# Patient Record
Sex: FEMALE | Race: BLACK OR AFRICAN AMERICAN | Employment: OTHER | URBAN - METROPOLITAN AREA
[De-identification: names, ages, dates, MRNs, and addresses within clinical notes are randomized per-mention and may not be internally consistent; named-entity substitution may affect disease eponyms.]

---

## 2017-09-25 ENCOUNTER — HOSPITAL ENCOUNTER (OUTPATIENT)
Dept: PREADMISSION TESTING | Age: 82
Discharge: HOME | End: 2017-09-25
Attending: OBSTETRICS & GYNECOLOGY | Admitting: OBSTETRICS & GYNECOLOGY

## 2017-09-25 VITALS — WEIGHT: 157 LBS | BODY MASS INDEX: 26.8 KG/M2 | HEIGHT: 64 IN

## 2017-09-25 DIAGNOSIS — Z01.811 PRE-OP CHEST EXAM: ICD-10-CM

## 2017-09-25 LAB
A/G RATIO: 1.3 (ref 1.1–2.2)
ABO/RH: NORMAL
ALBUMIN SERPL-MCNC: 4.3 G/DL (ref 3.4–5)
ALP BLD-CCNC: 73 U/L (ref 40–129)
ALT SERPL-CCNC: 34 U/L (ref 10–40)
ANION GAP SERPL CALCULATED.3IONS-SCNC: 9 MMOL/L (ref 3–16)
ANTIBODY SCREEN: NORMAL
AST SERPL-CCNC: 49 U/L (ref 15–37)
BASOPHILS ABSOLUTE: 0 K/UL (ref 0–0.2)
BASOPHILS RELATIVE PERCENT: 1.1 %
BILIRUB SERPL-MCNC: 0.5 MG/DL (ref 0–1)
BUN BLDV-MCNC: 16 MG/DL (ref 7–20)
CALCIUM SERPL-MCNC: 9 MG/DL (ref 8.3–10.6)
CHLORIDE BLD-SCNC: 101 MMOL/L (ref 99–110)
CO2: 31 MMOL/L (ref 21–32)
CREAT SERPL-MCNC: 0.7 MG/DL (ref 0.6–1.2)
EOSINOPHILS ABSOLUTE: 0 K/UL (ref 0–0.6)
EOSINOPHILS RELATIVE PERCENT: 1.1 %
GFR AFRICAN AMERICAN: >60
GFR NON-AFRICAN AMERICAN: >60
GLOBULIN: 3.2 G/DL
GLUCOSE BLD-MCNC: 115 MG/DL (ref 70–99)
HCT VFR BLD CALC: 39.5 % (ref 36–48)
HEMOGLOBIN: 12.6 G/DL (ref 12–16)
LYMPHOCYTES ABSOLUTE: 1.7 K/UL (ref 1–5.1)
LYMPHOCYTES RELATIVE PERCENT: 43 %
MCH RBC QN AUTO: 26.9 PG (ref 26–34)
MCHC RBC AUTO-ENTMCNC: 31.9 G/DL (ref 31–36)
MCV RBC AUTO: 84.4 FL (ref 80–100)
MONOCYTES ABSOLUTE: 0.4 K/UL (ref 0–1.3)
MONOCYTES RELATIVE PERCENT: 10.6 %
NEUTROPHILS ABSOLUTE: 1.7 K/UL (ref 1.7–7.7)
NEUTROPHILS RELATIVE PERCENT: 44.2 %
PDW BLD-RTO: 15.3 % (ref 12.4–15.4)
PLATELET # BLD: 166 K/UL (ref 135–450)
PMV BLD AUTO: 8.5 FL (ref 5–10.5)
POTASSIUM SERPL-SCNC: 4 MMOL/L (ref 3.5–5.1)
RBC # BLD: 4.68 M/UL (ref 4–5.2)
SODIUM BLD-SCNC: 141 MMOL/L (ref 136–145)
TOTAL PROTEIN: 7.5 G/DL (ref 6.4–8.2)
WBC # BLD: 3.9 K/UL (ref 4–11)

## 2017-09-25 RX ORDER — CYCLOBENZAPRINE HCL 10 MG
10 TABLET ORAL 3 TIMES DAILY PRN
COMMUNITY
End: 2019-01-15 | Stop reason: ALTCHOICE

## 2017-09-25 RX ORDER — CIPROFLOXACIN 2 MG/ML
400 INJECTION, SOLUTION INTRAVENOUS
Status: CANCELLED | OUTPATIENT
Start: 2017-09-28 | End: 2017-09-28

## 2017-09-25 RX ORDER — NAPROXEN 500 MG/1
500 TABLET ORAL 2 TIMES DAILY WITH MEALS
COMMUNITY
End: 2019-01-15 | Stop reason: ALTCHOICE

## 2017-09-25 RX ORDER — VITAMIN E 268 MG
400 CAPSULE ORAL DAILY
COMMUNITY

## 2017-09-25 RX ORDER — FUROSEMIDE 40 MG/1
40 TABLET ORAL DAILY
COMMUNITY

## 2017-09-25 RX ORDER — TRAMADOL HYDROCHLORIDE 50 MG/1
50 TABLET ORAL EVERY 6 HOURS PRN
COMMUNITY
End: 2019-01-15 | Stop reason: ALTCHOICE

## 2017-09-25 RX ORDER — LIDOCAINE HYDROCHLORIDE 10 MG/ML
0.5 INJECTION, SOLUTION EPIDURAL; INFILTRATION; INTRACAUDAL; PERINEURAL ONCE
Status: CANCELLED | OUTPATIENT
Start: 2017-09-28

## 2017-09-25 RX ORDER — PHENAZOPYRIDINE HYDROCHLORIDE 100 MG/1
200 TABLET, FILM COATED ORAL
Status: CANCELLED | OUTPATIENT
Start: 2017-09-28 | End: 2017-09-28

## 2017-09-25 RX ORDER — ACETAMINOPHEN 500 MG
500 TABLET ORAL EVERY 6 HOURS PRN
COMMUNITY

## 2017-09-25 RX ORDER — DILTIAZEM HYDROCHLORIDE 60 MG/1
60 TABLET, FILM COATED ORAL 4 TIMES DAILY PRN
COMMUNITY
End: 2019-01-15 | Stop reason: ALTCHOICE

## 2017-09-25 RX ORDER — ASCORBIC ACID 500 MG
500 TABLET ORAL DAILY
COMMUNITY

## 2017-09-25 RX ORDER — SODIUM CHLORIDE, SODIUM LACTATE, POTASSIUM CHLORIDE, CALCIUM CHLORIDE 600; 310; 30; 20 MG/100ML; MG/100ML; MG/100ML; MG/100ML
INJECTION, SOLUTION INTRAVENOUS CONTINUOUS
Status: CANCELLED | OUTPATIENT
Start: 2017-09-28

## 2017-09-25 RX ORDER — OMEPRAZOLE 40 MG/1
40 CAPSULE, DELAYED RELEASE ORAL 2 TIMES DAILY
COMMUNITY
End: 2020-11-10 | Stop reason: ALTCHOICE

## 2017-09-25 RX ORDER — HEPARIN SODIUM 5000 [USP'U]/ML
5000 INJECTION, SOLUTION INTRAVENOUS; SUBCUTANEOUS
Status: CANCELLED | OUTPATIENT
Start: 2017-09-28 | End: 2017-09-28

## 2017-09-25 RX ORDER — CHLORAL HYDRATE 500 MG
3000 CAPSULE ORAL 3 TIMES DAILY
COMMUNITY
End: 2019-01-15 | Stop reason: ALTCHOICE

## 2017-09-25 RX ORDER — ATORVASTATIN CALCIUM 40 MG/1
40 TABLET, FILM COATED ORAL DAILY
COMMUNITY

## 2017-09-25 RX ORDER — LOSARTAN POTASSIUM 50 MG/1
50 TABLET ORAL DAILY
COMMUNITY

## 2017-09-25 RX ORDER — FAMOTIDINE 20 MG/1
20 TABLET, FILM COATED ORAL 2 TIMES DAILY
Status: ON HOLD | COMMUNITY
End: 2017-09-28 | Stop reason: ALTCHOICE

## 2017-09-29 PROBLEM — I48.91 ATRIAL FIBRILLATION (HCC): Status: ACTIVE | Noted: 2017-02-28

## 2017-09-29 PROBLEM — I21.09 ACUTE MYOCARDIAL INFARCTION OF ANTERIOR WALL (HCC): Status: ACTIVE | Noted: 2017-02-28

## 2017-09-29 PROBLEM — I48.0 PAROXYSMAL ATRIAL FIBRILLATION (HCC): Status: ACTIVE | Noted: 2017-07-21

## 2017-09-29 PROBLEM — I10 ESSENTIAL (PRIMARY) HYPERTENSION: Status: ACTIVE | Noted: 2017-01-19

## 2019-01-15 RX ORDER — FERROUS SULFATE 325(65) MG
325 TABLET ORAL
COMMUNITY

## 2019-01-15 RX ORDER — CHOLECALCIFEROL (VITAMIN D3) 125 MCG
500 CAPSULE ORAL DAILY
COMMUNITY

## 2019-01-15 RX ORDER — OXYBUTYNIN CHLORIDE 5 MG/1
5 TABLET ORAL DAILY
COMMUNITY
End: 2020-11-10 | Stop reason: ALTCHOICE

## 2019-01-15 RX ORDER — PREDNISOLONE SODIUM PHOSPHATE 10 MG/ML
SOLUTION/ DROPS OPHTHALMIC
COMMUNITY
End: 2020-11-10 | Stop reason: ALTCHOICE

## 2019-01-15 RX ORDER — MULTIVIT-MIN/IRON/FOLIC ACID/K 18-600-40
CAPSULE ORAL DAILY
COMMUNITY

## 2019-01-15 RX ORDER — AMLODIPINE BESYLATE 5 MG/1
5 TABLET ORAL DAILY
COMMUNITY

## 2019-01-15 NOTE — PROGRESS NOTES
Name_______________________________________Printed:____________________  Date and time of ngqlwgp__6-20[birth year]______________________Zsstvqp Time:_1130 MASC_______________   1. Do not eat or drink anything after 12 midnight (or____hours) prior to surgery. This includes no water, chewing gum or mints. Endoscopy patients follow your doctors bowel prep instructions,which may include taking part of prep after midnight. 2. Take the following pills with a small sip of water on the morning of surgery___SOTOLOL, OMEPRAZOLE, AMLODIPINE __________________________________________________   3. Aspirin, Ibuprofen, Advil, Naproxen, Vitamin E and other Anti-inflammatory products should be stopped for 5 days before surgery or as directed by your physician. 4. Check with your Doctor regarding stopping Plavix, Coumadin,Eliquis, Lovenox,Effient,Pradaxa,Xarelto, Fragmin or other blood thinners and follow their instructions. 5. Do not smoke, and do not drink any alcoholic beverages 24 hours prior to surgery. This includes NA Beer. Refrain from the usage of any recreational drugs. 6. You may brush your teeth and gargle the morning of surgery. DO NOT SWALLOW WATER   7. You MUST make arrangements for a responsible adult to stay on site while you are here and take you home after your surgery. You will not be allowed to leave alone or drive yourself home. It is strongly suggested someone stay with you the first 24 hrs. Your surgery will be cancelled if you do not have a ride home. 8. A parent/legal guardian must accompany a child scheduled for surgery and plan to stay at the hospital until the child is discharged. Please do not bring other children with you. 9. Please wear simple, loose fitting clothing to the hospital.  Alber Nones not bring valuables (money, credit cards, checkbooks, etc.) Do not wear any makeup (including no eye makeup) or nail polish on your fingers or toes.              10. DO NOT wear any jewelry or piercings on day of surgery. All body piercing jewelry must be removed. 11. If you have ___dentures, they will be removed before going to the OR; we will provide you a container. If you wear ___contact lenses or ___glasses, they will be removed; please bring a case for them. 12. Please see your family doctor/pediatrician for a history & physical and/or concerning medications. Bring any test results/reports from your physician's office. PCP__________________Phone___________H&P Appt. Date________             13 If you  have a Living Will and Durable Power of  for Healthcare, please bring in a copy. 15. Notify your Surgeon if you develop any illness between now and surgery  time, cough, cold, fever, sore throat, nausea, vomiting, etc.  Please notify your surgeon if you experience dizziness, shortness of breath or blurred vision between now & the time of your surgery             15. DO NOT shave your operative site 96 hours prior to surgery. For face & neck surgery, men may use an electric razor 48 hours prior to surgery. 16. Shower the night before surgery with ___Antibacterial soap ___Hibiclens             17. To provide excellent care visitors will be limited to one in the room at any given time. 18.  Please bring picture ID and insurance card. 19.  Visit our web site for additional information:  [email or web address]              20.During flu season no children under the age of 15 are permitted in the hospital for the safety of all patients. 21. If you take a long acting insulin in the evening only  take half of your usual  dose the night  before your procedure              22. If you use a c-pap please bring DOS if staying overnight,             23.For your convenience [facility] has a pharmacy on site to fill your prescriptions.              24. If you use oxygen and have a portable tank please bring it  with you the DOS 22. Bring a complete list of all your medications with name and dose include any supplements. 26. Other__________________________________________   *Please call pre admission testing if you any further questions   [address]    [address]  [phone]   [address] [place] [address]       All above information reviewed with patient in person or by phone. Patient verbalizes understanding. All questions and concerns addressed.                                                                                                  Patient/Rep____________________                                                                                                                                    PRE OP INSTRUCTIONS

## 2019-01-25 ENCOUNTER — HOSPITAL ENCOUNTER (OUTPATIENT)
Age: 84
Setting detail: OUTPATIENT SURGERY
Discharge: HOME | End: 2019-01-25
Attending: PODIATRIST | Admitting: PODIATRIST
Payer: MEDICARE

## 2019-01-25 ENCOUNTER — ANESTHESIA (OUTPATIENT)
Dept: OPERATING ROOM | Age: 84
End: 2019-01-25
Payer: MEDICARE

## 2019-01-25 ENCOUNTER — ANESTHESIA EVENT (OUTPATIENT)
Dept: OPERATING ROOM | Age: 84
End: 2019-01-25
Payer: MEDICARE

## 2019-01-25 VITALS
HEART RATE: 51 BPM | WEIGHT: 158.9 LBS | RESPIRATION RATE: 17 BRPM | HEIGHT: 64 IN | OXYGEN SATURATION: 100 % | TEMPERATURE: 98.7 F | SYSTOLIC BLOOD PRESSURE: 140 MMHG | BODY MASS INDEX: 27.13 KG/M2 | DIASTOLIC BLOOD PRESSURE: 71 MMHG

## 2019-01-25 VITALS — SYSTOLIC BLOOD PRESSURE: 89 MMHG | TEMPERATURE: 96.8 F | DIASTOLIC BLOOD PRESSURE: 50 MMHG | OXYGEN SATURATION: 98 %

## 2019-01-25 DIAGNOSIS — M79.671 PAIN IN BOTH FEET: Primary | ICD-10-CM

## 2019-01-25 DIAGNOSIS — M79.672 PAIN IN BOTH FEET: Primary | ICD-10-CM

## 2019-01-25 PROCEDURE — 3600000014 HC SURGERY LEVEL 4 ADDTL 15MIN: Performed by: PODIATRIST

## 2019-01-25 PROCEDURE — 2500000003 HC RX 250 WO HCPCS: Performed by: PODIATRIST

## 2019-01-25 PROCEDURE — 7100000011 HC PHASE II RECOVERY - ADDTL 15 MIN: Performed by: PODIATRIST

## 2019-01-25 PROCEDURE — 2580000003 HC RX 258: Performed by: FAMILY MEDICINE

## 2019-01-25 PROCEDURE — 3700000000 HC ANESTHESIA ATTENDED CARE: Performed by: PODIATRIST

## 2019-01-25 PROCEDURE — 7100000000 HC PACU RECOVERY - FIRST 15 MIN: Performed by: PODIATRIST

## 2019-01-25 PROCEDURE — 3600000004 HC SURGERY LEVEL 4 BASE: Performed by: PODIATRIST

## 2019-01-25 PROCEDURE — 3700000001 HC ADD 15 MINUTES (ANESTHESIA): Performed by: PODIATRIST

## 2019-01-25 PROCEDURE — 7100000001 HC PACU RECOVERY - ADDTL 15 MIN: Performed by: PODIATRIST

## 2019-01-25 PROCEDURE — 2500000003 HC RX 250 WO HCPCS: Performed by: NURSE ANESTHETIST, CERTIFIED REGISTERED

## 2019-01-25 PROCEDURE — 7100000010 HC PHASE II RECOVERY - FIRST 15 MIN: Performed by: PODIATRIST

## 2019-01-25 PROCEDURE — 2709999900 HC NON-CHARGEABLE SUPPLY: Performed by: PODIATRIST

## 2019-01-25 PROCEDURE — 6360000002 HC RX W HCPCS: Performed by: NURSE ANESTHETIST, CERTIFIED REGISTERED

## 2019-01-25 RX ORDER — SODIUM CHLORIDE 9 MG/ML
INJECTION, SOLUTION INTRAVENOUS CONTINUOUS
Status: DISCONTINUED | OUTPATIENT
Start: 2019-01-25 | End: 2019-01-25 | Stop reason: HOSPADM

## 2019-01-25 RX ORDER — BUPIVACAINE HYDROCHLORIDE 5 MG/ML
INJECTION, SOLUTION EPIDURAL; INTRACAUDAL
Status: COMPLETED | OUTPATIENT
Start: 2019-01-25 | End: 2019-01-25

## 2019-01-25 RX ORDER — CLINDAMYCIN PHOSPHATE 900 MG/50ML
900 INJECTION INTRAVENOUS
Status: COMPLETED | OUTPATIENT
Start: 2019-01-25 | End: 2019-01-25

## 2019-01-25 RX ORDER — DIPHENHYDRAMINE HYDROCHLORIDE 50 MG/ML
12.5 INJECTION INTRAMUSCULAR; INTRAVENOUS
Status: DISCONTINUED | OUTPATIENT
Start: 2019-01-25 | End: 2019-01-25 | Stop reason: HOSPADM

## 2019-01-25 RX ORDER — OXYCODONE HYDROCHLORIDE AND ACETAMINOPHEN 5; 325 MG/1; MG/1
1 TABLET ORAL EVERY 6 HOURS PRN
Qty: 28 TABLET | Refills: 0 | Status: SHIPPED | OUTPATIENT
Start: 2019-01-25 | End: 2019-02-01

## 2019-01-25 RX ORDER — LIDOCAINE HYDROCHLORIDE 10 MG/ML
0.5 INJECTION, SOLUTION EPIDURAL; INFILTRATION; INTRACAUDAL; PERINEURAL ONCE
Status: DISCONTINUED | OUTPATIENT
Start: 2019-01-25 | End: 2019-01-25 | Stop reason: HOSPADM

## 2019-01-25 RX ORDER — LIDOCAINE HYDROCHLORIDE 20 MG/ML
INJECTION, SOLUTION INFILTRATION; PERINEURAL PRN
Status: DISCONTINUED | OUTPATIENT
Start: 2019-01-25 | End: 2019-01-25 | Stop reason: SDUPTHER

## 2019-01-25 RX ORDER — PROPOFOL 10 MG/ML
INJECTION, EMULSION INTRAVENOUS CONTINUOUS PRN
Status: DISCONTINUED | OUTPATIENT
Start: 2019-01-25 | End: 2019-01-25 | Stop reason: SDUPTHER

## 2019-01-25 RX ORDER — SODIUM CHLORIDE 0.9 % (FLUSH) 0.9 %
10 SYRINGE (ML) INJECTION EVERY 12 HOURS SCHEDULED
Status: DISCONTINUED | OUTPATIENT
Start: 2019-01-25 | End: 2019-01-25 | Stop reason: HOSPADM

## 2019-01-25 RX ORDER — OXYCODONE HYDROCHLORIDE 5 MG/1
5 TABLET ORAL PRN
Status: DISCONTINUED | OUTPATIENT
Start: 2019-01-25 | End: 2019-01-25 | Stop reason: HOSPADM

## 2019-01-25 RX ORDER — PROMETHAZINE HYDROCHLORIDE 25 MG/1
25 TABLET ORAL 3 TIMES DAILY PRN
Qty: 12 TABLET | Refills: 0 | Status: SHIPPED | OUTPATIENT
Start: 2019-01-25 | End: 2019-02-01

## 2019-01-25 RX ORDER — FENTANYL CITRATE 50 UG/ML
50 INJECTION, SOLUTION INTRAMUSCULAR; INTRAVENOUS EVERY 5 MIN PRN
Status: DISCONTINUED | OUTPATIENT
Start: 2019-01-25 | End: 2019-01-25 | Stop reason: HOSPADM

## 2019-01-25 RX ORDER — HYDROMORPHONE HCL 110MG/55ML
0.25 PATIENT CONTROLLED ANALGESIA SYRINGE INTRAVENOUS EVERY 5 MIN PRN
Status: DISCONTINUED | OUTPATIENT
Start: 2019-01-25 | End: 2019-01-25 | Stop reason: HOSPADM

## 2019-01-25 RX ORDER — MEPERIDINE HYDROCHLORIDE 25 MG/ML
12.5 INJECTION INTRAMUSCULAR; INTRAVENOUS; SUBCUTANEOUS EVERY 5 MIN PRN
Status: DISCONTINUED | OUTPATIENT
Start: 2019-01-25 | End: 2019-01-25 | Stop reason: HOSPADM

## 2019-01-25 RX ORDER — PROPOFOL 10 MG/ML
INJECTION, EMULSION INTRAVENOUS PRN
Status: DISCONTINUED | OUTPATIENT
Start: 2019-01-25 | End: 2019-01-25 | Stop reason: SDUPTHER

## 2019-01-25 RX ORDER — LABETALOL HYDROCHLORIDE 5 MG/ML
5 INJECTION, SOLUTION INTRAVENOUS EVERY 10 MIN PRN
Status: DISCONTINUED | OUTPATIENT
Start: 2019-01-25 | End: 2019-01-25 | Stop reason: HOSPADM

## 2019-01-25 RX ORDER — HYDROMORPHONE HCL 110MG/55ML
0.5 PATIENT CONTROLLED ANALGESIA SYRINGE INTRAVENOUS EVERY 5 MIN PRN
Status: DISCONTINUED | OUTPATIENT
Start: 2019-01-25 | End: 2019-01-25 | Stop reason: HOSPADM

## 2019-01-25 RX ORDER — SODIUM CHLORIDE 0.9 % (FLUSH) 0.9 %
10 SYRINGE (ML) INJECTION PRN
Status: DISCONTINUED | OUTPATIENT
Start: 2019-01-25 | End: 2019-01-25 | Stop reason: HOSPADM

## 2019-01-25 RX ORDER — SODIUM CHLORIDE, SODIUM LACTATE, POTASSIUM CHLORIDE, CALCIUM CHLORIDE 600; 310; 30; 20 MG/100ML; MG/100ML; MG/100ML; MG/100ML
INJECTION, SOLUTION INTRAVENOUS CONTINUOUS
Status: DISCONTINUED | OUTPATIENT
Start: 2019-01-25 | End: 2019-01-25 | Stop reason: HOSPADM

## 2019-01-25 RX ORDER — PROMETHAZINE HYDROCHLORIDE 25 MG/ML
6.25 INJECTION, SOLUTION INTRAMUSCULAR; INTRAVENOUS PRN
Status: DISCONTINUED | OUTPATIENT
Start: 2019-01-25 | End: 2019-01-25 | Stop reason: HOSPADM

## 2019-01-25 RX ORDER — OXYCODONE HYDROCHLORIDE 5 MG/1
10 TABLET ORAL PRN
Status: DISCONTINUED | OUTPATIENT
Start: 2019-01-25 | End: 2019-01-25 | Stop reason: HOSPADM

## 2019-01-25 RX ADMIN — LIDOCAINE HYDROCHLORIDE 50 MG: 20 INJECTION, SOLUTION INFILTRATION; PERINEURAL at 12:10

## 2019-01-25 RX ADMIN — PROPOFOL 75 MCG/KG/MIN: 10 INJECTION, EMULSION INTRAVENOUS at 12:10

## 2019-01-25 RX ADMIN — SODIUM CHLORIDE: 9 INJECTION, SOLUTION INTRAVENOUS at 12:22

## 2019-01-25 RX ADMIN — CLINDAMYCIN PHOSPHATE 900 MG: 900 INJECTION, SOLUTION INTRAVENOUS at 12:00

## 2019-01-25 RX ADMIN — PROPOFOL 40 MG: 10 INJECTION, EMULSION INTRAVENOUS at 12:10

## 2019-01-25 RX ADMIN — SODIUM CHLORIDE: 9 INJECTION, SOLUTION INTRAVENOUS at 12:05

## 2019-01-25 RX ADMIN — SODIUM CHLORIDE: 9 INJECTION, SOLUTION INTRAVENOUS at 11:00

## 2019-01-25 ASSESSMENT — PULMONARY FUNCTION TESTS
PIF_VALUE: 0
PIF_VALUE: 1
PIF_VALUE: 0
PIF_VALUE: 1
PIF_VALUE: 0
PIF_VALUE: 1
PIF_VALUE: 0
PIF_VALUE: 0
PIF_VALUE: 1
PIF_VALUE: 0
PIF_VALUE: 1
PIF_VALUE: 0
PIF_VALUE: 1
PIF_VALUE: 0
PIF_VALUE: 1
PIF_VALUE: 0
PIF_VALUE: 1
PIF_VALUE: 0
PIF_VALUE: 1
PIF_VALUE: 0
PIF_VALUE: 1
PIF_VALUE: 0
PIF_VALUE: 0
PIF_VALUE: 1
PIF_VALUE: 0
PIF_VALUE: 1
PIF_VALUE: 0
PIF_VALUE: 1
PIF_VALUE: 0

## 2019-01-25 ASSESSMENT — PAIN - FUNCTIONAL ASSESSMENT: PAIN_FUNCTIONAL_ASSESSMENT: 0-10

## 2019-01-25 NOTE — PROGRESS NOTES
CLINICAL PHARMACY NOTE: MEDS TO [address] Select Patient?: No  Total # of Prescriptions Filled: 1   The following medications were delivered to the patient:  · Oxycodone/APAP  Total # of Interventions Completed: 0  Time Spent (min): 45    Additional Documentation:  Delivered to patient  Shi Jco

## 2019-01-25 NOTE — BRIEF OP NOTE
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

## 2019-01-25 NOTE — PROGRESS NOTES
Pt arrived from OR to PACU bay 4. Report received from OR staff. Pt awake and alert. Surgical dressings in place to bilateral feet. Pt on RA, NSR, VSS. Will continue to monitor.

## 2019-01-25 NOTE — PROGRESS NOTES
Pt awake and alert. Pt on RA, VSS. Son at bedside. Pt denies pain and nausea, tolerating PO. Skin warm RLE/LLE, unable to assess pulses due to dressings, able to wiggle bilat toes. Pt meets criteria to be discharged from phase 1.

## 2019-01-25 NOTE — H&P
[facility]   HISTORY AND PHYSICAL      I am seeing Charlie Harrington at the request of Dr Art Vasques for evaluation of the patient's medical problems prior to hammertoe correction toes two, four and five right foot,   Hammertoe correction toes two and three left foot     1/25/2019 7:43 AM    Patient Information:  Charlie Harrington is a 82 y.o. female [id number]  PCP:  Ingrid Osler, MD (Tel: [phone] )    Chief complaint:  I am having surgery on my toes     History of Present Illness:  Shama Martinez is a 82 y.o. female who presented with foot pain. Symptom onset was gradual for a time period of several months. The severity is described as moderate. The course of her symptoms over time is recurrent. The symptoms improved with non weight bearing and worsened with weight bearing and wearing tight fitting shoes. The patient's symptom is associated with bunions. History obtained from patient and record review   No issues with recent anesthesia     REVIEW OF SYSTEMS:   Constitutional:  Negative for fever,chills or night sweats  ENT:  Negative for rhinorrhea, epistaxis, hoarseness, sore throat. Respiratory:   Negative for shortness of breath,wheezing  Cardiovascular:   Negative for  chest pain, palpitations   Gastrointestinal:  Negative for nausea, vomiting, diarrhea  Genitourinary:  Negative for polyuria, dysuria   Hematologic/Lymphatic:  Negative for  bleeding tendency, easy bruising  Musculoskeletal:  Negative for myalgias and arthralgias  Neurologic:  Negative for  confusion,dysarthria. Skin:  Negative for itching,rash  Psychiatric:  Negative for depression,anxiety, agitation. Endocrine:  Negative for polydipsia,polyuria,heat /cold intolerance. Past Medical History:   has a past medical history of Arthritis; Asthma; CAD (coronary artery disease); GERD (gastroesophageal reflux disease); Heart attack ([facility] [address]  History of atrial fibrillation; Hyperlipidemia; Hypertension; Irregular heart beat; Liver cyst; Polycystic liver disease; Prinzmetal angina ([address] and Raynaud's disease. Past Surgical History:   has a past surgical history that includes joint replacement; Corneal transplant; Hysterectomy, vaginal (09/28/2017); eye surgery (Bilateral); and Bunionectomy. Medications:  No current facility-administered medications on file prior to encounter. Current Outpatient Prescriptions on File Prior to Encounter   Medication Sig Dispense Refill    Ascorbic Acid (VITAMIN C) 500 MG tablet Take 500 mg by mouth daily      aspirin 81 MG tablet Take 81 mg by mouth daily EC      atorvastatin (LIPITOR) 40 MG tablet Take 40 mg by mouth daily      Calcium Carbonate-Vit D-Min (CALCIUM 1200 PO) Take 600 mg by mouth 2 times daily       vitamin D ([employer]S VIT D 5000 HIGH-POTENCY) 5000 units CAPS capsule Take 50,000 Units by mouth daily      furosemide (LASIX) 40 MG tablet Take 40 mg by mouth 2 times daily ONLY TAKES 20 MG ONCE A DAY      losartan (COZAAR) 50 MG tablet Take 50 mg by mouth daily      omeprazole (PRILOSEC) 40 MG delayed release capsule Take 40 mg by mouth 2 times daily       Potassium Chloride (KLOR-CON 10 PO) Take 20 mEq by mouth daily       sotalol (BETAPACE) 80 MG tablet Take 80 mg by mouth 2 times daily.  acetaminophen (TYLENOL) 500 MG tablet Take 500 mg by mouth every 6 hours as needed for Pain      vitamin E 400 UNIT capsule Take 400 Units by mouth daily      NITROGLYCERIN SL Place 0.4 mg under the tongue as needed          Allergies: Allergies   Allergen Reactions    Ceclor [Cefaclor]      RASH    Ibandronic Acid         Social History:   reports that she has never smoked. She has never used smokeless tobacco. She reports that she does not drink alcohol or use drugs.      Family History:  HTN in her father     Physical Exam:  BP (!) 141/69   Pulse (!) 49   Temp 97.3 °F (36.3 °C) (Temporal)   Resp 16   Ht 5'

## 2019-01-25 NOTE — PROGRESS NOTES
Discharge instructions reviewed with patient/son. All home medications have been reviewed, questions answered and patient verbalized understanding. Discharge instructions signed. Pt dc'd per wheelchair. Patient discharged home with two surgical shoes, one prescriptions and other belongings. Son taking stable pt home.

## 2019-03-05 NOTE — OP NOTE
capillary refill time  was immediate. The patient tolerated the procedure without difficulty  and she will be seen in the office in one week for suture removal and  dressing change.         Elva Luo DPM    D: 03/04/2019 15:40:26       T: 03/05/2019 3:17:20     [email or web address]  Job#: [id number]     Doc#: [id number]    CC:  Genesis Chan DPM

## 2020-11-05 ENCOUNTER — NURSE ONLY (OUTPATIENT)
Dept: PRIMARY CARE CLINIC | Age: 85
End: 2020-11-05
Payer: MEDICARE

## 2020-11-05 PROCEDURE — 99211 OFF/OP EST MAY X REQ PHY/QHP: CPT | Performed by: NURSE PRACTITIONER

## 2020-11-06 LAB — SARS-COV-2: NOT DETECTED

## 2020-11-10 ENCOUNTER — ANESTHESIA EVENT (OUTPATIENT)
Dept: ENDOSCOPY | Age: 85
End: 2020-11-10
Payer: MEDICARE

## 2020-11-10 RX ORDER — CYCLOSPORINE 0.5 MG/ML
1 EMULSION OPHTHALMIC 2 TIMES DAILY
COMMUNITY

## 2020-11-10 RX ORDER — LOTEPREDNOL ETABONATE 10 MG/ML
1 SUSPENSION TOPICAL DAILY
COMMUNITY

## 2020-11-10 NOTE — PROGRESS NOTES
C-Difficile admission screening and protocol:     * Admitted with diarrhea? YES____    NO__X___     *Prior history of C-Diff. In last 3 months? YES____   NO__X___     *Antibiotic use in the past 6-8 weeks? NO____X__YES______                 If yes which  ANTIBIOTIC AND REASON______     *Prior hospitalization or nursing home in the last month?  YES____   NO__X__

## 2020-11-10 NOTE — PROGRESS NOTES

## 2020-11-10 NOTE — PROGRESS NOTES
[facility] time____0800________        Surgery time____0930________    Take the following medications with a sip of water: Follow your MD/Surgeons pre-procedure instructions regarding your medications    Do not eat or drink anything after 12:00 midnight prior to your surgery. This includes water chewing gum, mints and ice chips. You may brush your teeth and gargle the morning of your surgery, but do not swallow the water     Please see your family doctor/pediatrician for a history and physical and/or concerning medications. Bring any test results/reports from your physicians office. If you are under the care of a heart doctor or specialist doctor, please be aware that you may be asked to them for clearance    You may be asked to stop blood thinners such as Coumadin, Plavix, Fragmin, Lovenox, etc., or any anti-inflammatories such as:  Aspirin, Ibuprofen, Advil, Naproxen prior to your surgery. We also ask that you stop any OTC medications such as fish oil, vitamin E, glucosamine, garlic, Multivitamins, COQ 10, etc.    We ask that you do not smoke 24 hours prior to surgery  We ask that you do not  drink any alcoholic beverages 24 hours prior to surgery     You must make arrangements for a responsible adult to take you home after your surgery. For your safety you will not be allowed to leave alone or drive yourself home. Your surgery will be cancelled if you do not have a ride home. Also for your safety, it is strongly suggested that someone stay with you the first 24 hours after your surgery. A parent or legal guardian must accompany a child scheduled for surgery and plan to stay at the hospital until the child is discharged. Please do not bring other children with you. For your comfort, please wear simple loose fitting clothing to the hospital.  Please do not bring valuables.     Do not wear any make-up or nail polish on your fingers or toes      For your safety, please do not wear any jewelry or body piercing's on the day of surgery. All jewelry must be removed. If you have dentures, they will be removed before going to operating room. For your convenience, we will provide you with a container. If you wear contact lenses or glasses, they will be removed, please bring a case for them. If you have a living will and a durable power of  for healthcare, please bring in a copy. As part of our patient safety program to minimize surgical site infections, we ask you to do the following:    · Please notify your surgeon if you develop any illness between         now and the  day of your surgery. · This includes a cough, cold, fever, sore throat, nausea,         or vomiting, and diarrhea, etc.  ·  Please notify your surgeon if you experience dizziness, shortness         of breath or blurred vision between now and the time of your surgery. Do not shave your operative site 96 hours prior to surgery. For face and neck surgery, men may use an electric razor 48 hours   prior to surgery. You may shower the night before surgery or the morning of   your surgery with an antibacterial soap. You will need to bring a photo ID and insurance card    [facility] has an onsite pharmacy, would you like to utilize our pharmacy     If you will be staying overnight and use a C-pap machine, please bring   your C-pap to hospital     Our goal is to provide you with excellent care, therefore, visitors will be limited to two(2) in the room at a time so that we may focus on providing this care for you. Please contact pre-admission testing if you have any further questions. [facility] phone number:  [phone] PAT fax number:  [phone]  Please note these are generalized instructions for all surgical cases, you may be provided with more specific instructions according to your surgery.

## 2020-11-11 ENCOUNTER — ANESTHESIA (OUTPATIENT)
Dept: ENDOSCOPY | Age: 85
End: 2020-11-11
Payer: MEDICARE

## 2020-11-11 ENCOUNTER — HOSPITAL ENCOUNTER (OUTPATIENT)
Age: 85
Setting detail: OUTPATIENT SURGERY
Discharge: HOME | End: 2020-11-11
Attending: INTERNAL MEDICINE | Admitting: INTERNAL MEDICINE
Payer: MEDICARE

## 2020-11-11 VITALS
BODY MASS INDEX: 24.28 KG/M2 | SYSTOLIC BLOOD PRESSURE: 126 MMHG | WEIGHT: 142.2 LBS | OXYGEN SATURATION: 100 % | DIASTOLIC BLOOD PRESSURE: 50 MMHG | TEMPERATURE: 97 F | HEART RATE: 56 BPM | HEIGHT: 64 IN | RESPIRATION RATE: 16 BRPM

## 2020-11-11 VITALS
SYSTOLIC BLOOD PRESSURE: 125 MMHG | OXYGEN SATURATION: 100 % | DIASTOLIC BLOOD PRESSURE: 58 MMHG | RESPIRATION RATE: 13 BRPM

## 2020-11-11 PROCEDURE — 2580000003 HC RX 258: Performed by: ANESTHESIOLOGY

## 2020-11-11 PROCEDURE — 7100000011 HC PHASE II RECOVERY - ADDTL 15 MIN: Performed by: INTERNAL MEDICINE

## 2020-11-11 PROCEDURE — 3700000001 HC ADD 15 MINUTES (ANESTHESIA): Performed by: INTERNAL MEDICINE

## 2020-11-11 PROCEDURE — 2709999900 HC NON-CHARGEABLE SUPPLY: Performed by: INTERNAL MEDICINE

## 2020-11-11 PROCEDURE — 7100000000 HC PACU RECOVERY - FIRST 15 MIN: Performed by: INTERNAL MEDICINE

## 2020-11-11 PROCEDURE — 3609027000 HC COLONOSCOPY: Performed by: INTERNAL MEDICINE

## 2020-11-11 PROCEDURE — 7100000001 HC PACU RECOVERY - ADDTL 15 MIN: Performed by: INTERNAL MEDICINE

## 2020-11-11 PROCEDURE — 6360000002 HC RX W HCPCS: Performed by: NURSE ANESTHETIST, CERTIFIED REGISTERED

## 2020-11-11 PROCEDURE — 3700000000 HC ANESTHESIA ATTENDED CARE: Performed by: INTERNAL MEDICINE

## 2020-11-11 PROCEDURE — 7100000010 HC PHASE II RECOVERY - FIRST 15 MIN: Performed by: INTERNAL MEDICINE

## 2020-11-11 PROCEDURE — 2500000003 HC RX 250 WO HCPCS: Performed by: NURSE ANESTHETIST, CERTIFIED REGISTERED

## 2020-11-11 RX ORDER — MEPERIDINE HYDROCHLORIDE 25 MG/ML
12.5 INJECTION INTRAMUSCULAR; INTRAVENOUS; SUBCUTANEOUS EVERY 5 MIN PRN
Status: DISCONTINUED | OUTPATIENT
Start: 2020-11-11 | End: 2020-11-11 | Stop reason: HOSPADM

## 2020-11-11 RX ORDER — LIDOCAINE HYDROCHLORIDE 20 MG/ML
INJECTION, SOLUTION EPIDURAL; INFILTRATION; INTRACAUDAL; PERINEURAL PRN
Status: DISCONTINUED | OUTPATIENT
Start: 2020-11-11 | End: 2020-11-11 | Stop reason: SDUPTHER

## 2020-11-11 RX ORDER — ONDANSETRON 2 MG/ML
4 INJECTION INTRAMUSCULAR; INTRAVENOUS
Status: DISCONTINUED | OUTPATIENT
Start: 2020-11-11 | End: 2020-11-11 | Stop reason: HOSPADM

## 2020-11-11 RX ORDER — SODIUM CHLORIDE 9 MG/ML
INJECTION, SOLUTION INTRAVENOUS CONTINUOUS
Status: DISCONTINUED | OUTPATIENT
Start: 2020-11-11 | End: 2020-11-11 | Stop reason: HOSPADM

## 2020-11-11 RX ORDER — MORPHINE SULFATE 2 MG/ML
1 INJECTION, SOLUTION INTRAMUSCULAR; INTRAVENOUS EVERY 5 MIN PRN
Status: DISCONTINUED | OUTPATIENT
Start: 2020-11-11 | End: 2020-11-11 | Stop reason: HOSPADM

## 2020-11-11 RX ORDER — FENTANYL CITRATE 50 UG/ML
50 INJECTION, SOLUTION INTRAMUSCULAR; INTRAVENOUS EVERY 5 MIN PRN
Status: DISCONTINUED | OUTPATIENT
Start: 2020-11-11 | End: 2020-11-11 | Stop reason: HOSPADM

## 2020-11-11 RX ORDER — PROPOFOL 10 MG/ML
INJECTION, EMULSION INTRAVENOUS PRN
Status: DISCONTINUED | OUTPATIENT
Start: 2020-11-11 | End: 2020-11-11 | Stop reason: SDUPTHER

## 2020-11-11 RX ORDER — OXYCODONE HYDROCHLORIDE 10 MG/1
10 TABLET ORAL PRN
Status: DISCONTINUED | OUTPATIENT
Start: 2020-11-11 | End: 2020-11-11 | Stop reason: HOSPADM

## 2020-11-11 RX ORDER — OXYCODONE HYDROCHLORIDE 5 MG/1
5 TABLET ORAL PRN
Status: DISCONTINUED | OUTPATIENT
Start: 2020-11-11 | End: 2020-11-11 | Stop reason: HOSPADM

## 2020-11-11 RX ORDER — FENTANYL CITRATE 50 UG/ML
25 INJECTION, SOLUTION INTRAMUSCULAR; INTRAVENOUS EVERY 5 MIN PRN
Status: DISCONTINUED | OUTPATIENT
Start: 2020-11-11 | End: 2020-11-11 | Stop reason: HOSPADM

## 2020-11-11 RX ORDER — MORPHINE SULFATE 2 MG/ML
2 INJECTION, SOLUTION INTRAMUSCULAR; INTRAVENOUS EVERY 5 MIN PRN
Status: DISCONTINUED | OUTPATIENT
Start: 2020-11-11 | End: 2020-11-11 | Stop reason: HOSPADM

## 2020-11-11 RX ORDER — SODIUM CHLORIDE 0.9 % (FLUSH) 0.9 %
10 SYRINGE (ML) INJECTION PRN
Status: DISCONTINUED | OUTPATIENT
Start: 2020-11-11 | End: 2020-11-11 | Stop reason: HOSPADM

## 2020-11-11 RX ORDER — SODIUM CHLORIDE 0.9 % (FLUSH) 0.9 %
10 SYRINGE (ML) INJECTION EVERY 12 HOURS SCHEDULED
Status: DISCONTINUED | OUTPATIENT
Start: 2020-11-11 | End: 2020-11-11 | Stop reason: HOSPADM

## 2020-11-11 RX ADMIN — PROPOFOL 40 MG: 10 INJECTION, EMULSION INTRAVENOUS at 09:39

## 2020-11-11 RX ADMIN — PROPOFOL 50 MG: 10 INJECTION, EMULSION INTRAVENOUS at 09:43

## 2020-11-11 RX ADMIN — PROPOFOL 50 MG: 10 INJECTION, EMULSION INTRAVENOUS at 09:36

## 2020-11-11 RX ADMIN — SODIUM CHLORIDE: 9 INJECTION, SOLUTION INTRAVENOUS at 08:58

## 2020-11-11 RX ADMIN — LIDOCAINE HYDROCHLORIDE 60 MG: 20 INJECTION, SOLUTION EPIDURAL; INFILTRATION; INTRACAUDAL; PERINEURAL at 09:36

## 2020-11-11 ASSESSMENT — PULMONARY FUNCTION TESTS
PIF_VALUE: 0
PIF_VALUE: 1
PIF_VALUE: 0

## 2020-11-11 ASSESSMENT — PAIN SCALES - GENERAL
PAINLEVEL_OUTOF10: 0

## 2020-11-11 ASSESSMENT — LIFESTYLE VARIABLES: SMOKING_STATUS: 0

## 2020-11-11 ASSESSMENT — ENCOUNTER SYMPTOMS: SHORTNESS OF BREATH: 0

## 2020-11-11 ASSESSMENT — PAIN - FUNCTIONAL ASSESSMENT: PAIN_FUNCTIONAL_ASSESSMENT: 0-10

## 2020-11-11 NOTE — ANESTHESIA PRE PROCEDURE
Historical Provider, MD   vitamin E 400 UNIT capsule Take 400 Units by mouth daily   Yes Historical Provider, MD   NITROGLYCERIN SL Place 0.4 mg under the tongue as needed    Yes Historical Provider, MD   sotalol (BETAPACE) 80 MG tablet Take 80 mg by mouth 2 times daily. Yes Historical Provider, MD       Current medications:    Current Facility-Administered Medications   Medication Dose Route Frequency Provider Last Rate Last Dose    0.9 % sodium chloride infusion   Intravenous Continuous David Maynard MD        sodium chloride flush 0.9 % injection 10 mL  10 mL Intravenous 2 times per day David Maynard MD        sodium chloride flush 0.9 % injection 10 mL  10 mL Intravenous PRN David Maynard MD           Allergies:     Allergies   Allergen Reactions    Cefaclor Rash    Ibandronic Acid Hives     Known as Boniva    Penicillins Rash       Problem List:    Patient Active Problem List   Diagnosis Code    Atrial fibrillation (HCC) I48.91    Acute myocardial infarction of anterior wall (HCC) I21.09    Essential (primary) hypertension I10    Gastro-esophageal reflux disease without esophagitis K21.9    Liver cyst K76.89    Paroxysmal atrial fibrillation (HCC) I48.0       Past Medical History:        Diagnosis Date    Arthritis     rheumatiod and osteo    Asthma     CAD (coronary artery disease)     Cancer ([facility] [address]     cancer of cervix--@ age 29   Gloriajean Seeds GERD (gastroesophageal reflux disease)     Heart attack ([facility] [address]     D/T CORONARY SPASM    History of atrial fibrillation     Hyperlipidemia     Hypertension     Liver cyst     REQUIRING DRAINAGE    Prinzmetal angina ([facility] [address]     Raynaud's disease        Past Surgical History:        Procedure Laterality Date    BUNIONECTOMY Bilateral     COLONOSCOPY      colon polyps    CORNEAL TRANSPLANT Bilateral     HAMMER TOE SURGERY Right 1/25/2019    HAMMERTOE CORRECTION TOES TWO, FOUR, AND FIVE RIGHT FOOT, HAMMERTOE CORRECTION TOE THIRD LEFT FOOT performed by Nata Alford 03/12/2013       HCG (If Applicable): No results found for: PREGTESTUR, PREGSERUM, HCG, HCGQUANT     ABGs: No results found for: PHART, PO2ART, WRH1ORG, FEG0HVZ, BEART, F6NSCZKY     Type & Screen (If Applicable):  No results found for: LABABO, LABRH    Drug/Infectious Status (If Applicable):  No results found for: HIV, HEPCAB    COVID-19 Screening (If Applicable):   Lab Results   Component Value Date    COVID19 Not Detected 11/05/2020         Anesthesia Evaluation  Patient summary reviewed and Nursing notes reviewed no history of anesthetic complications:   Airway: Mallampati: II  TM distance: >3 FB   Neck ROM: full  Mouth opening: > = 3 FB Dental:      Comment: No loose teeth    Pulmonary: breath sounds clear to auscultation  (+) asthma:     (-) pneumonia, COPD, shortness of breath, recent URI, sleep apnea and not a current smoker                           Cardiovascular:  Exercise tolerance: good (>4 METS),   (+) hypertension:, angina:, past MI:, CAD:, dysrhythmias: atrial fibrillation,     (-) pacemaker, valvular problems/murmurs, CABG/stent,  CHF, orthopnea, PND and  JAFFE      Rhythm: regular                      Neuro/Psych:   (+) neuromuscular disease:,    (-) seizures, TIA, CVA, headaches, psychiatric history and depression/anxiety            GI/Hepatic/Renal:   (+) GERD:, bowel prep,      (-) hiatal hernia, PUD, hepatitis and no morbid obesity       Endo/Other:    (+) : arthritis: OA and rheumatoid. , no malignancy/cancer. (-) diabetes mellitus, hypothyroidism, hyperthyroidism, blood dyscrasia, no electrolyte abnormalities, no malignancy/cancer               Abdominal:           Vascular: negative vascular ROS. Anesthesia Plan      MAC     ASA 3       Induction: intravenous. MIPS: Prophylactic antiemetics administered. Anesthetic plan and risks discussed with patient and child/children. Plan discussed with CRNA.                 Yuriy Anthony MD 11/11/2020      This pre-anesthesia assessment may be used as a history and physical.    DOS STAFF ADDENDUM:    Pt seen and examined, chart reviewed (including anesthesia, drug and allergy history). No interval changes to history and physical examination. Anesthetic plan, risks, benefits, alternatives, and personnel involved discussed with patient. Patient verbalized an understanding and agrees to proceed.       Chantal Gagnon MD  November 11, 2020  8:34 AM

## 2020-11-11 NOTE — PROGRESS NOTES
Arrives to PACU, vital signs stable, IV infusing without complications, no response to voice, respiration easy and even.

## 2020-11-11 NOTE — ANESTHESIA POSTPROCEDURE EVALUATION
Department of Anesthesiology  Postprocedure Note    Patient: Marie Dotson  MRN: [id number]  YOB: 1935  Date of evaluation: 11/11/2020  Time:  12:27 PM     Procedure Summary     Date:  11/11/20 Room / Location:  [address] [place] [address]    Anesthesia Start:  5490 Anesthesia Stop:  5661    Procedure:  COLONOSCOPY (N/A ) Diagnosis:  (HISTORY OF POLYPS)    Surgeon:  Melissa Alfred MD Responsible Provider:  Felipe Campuzano MD    Anesthesia Type:  MAC ASA Status:  3          Anesthesia Type: MAC    Rivera Phase I: Rivera Score: 10    Rivera Phase II: Rivera Score: 10    Last vitals: Reviewed and per EMR flowsheets.        Anesthesia Post Evaluation    Patient location during evaluation: PACU  Patient participation: complete - patient participated  Level of consciousness: awake and alert  Pain score: 0  Airway patency: patent  Nausea & Vomiting: no nausea and no vomiting  Complications: no  Cardiovascular status: blood pressure returned to baseline  Respiratory status: acceptable  Hydration status: euvolemic

## 2020-11-11 NOTE — PROGRESS NOTES
Alert and oriented. No c/o. Vss. abd soft. Tolerated sitting up and po fluids and cookies well. Son at bedside. Verbalized understanding of discharge instructions.

## 2020-11-11 NOTE — PROCEDURES
[facility]  Endoscopy Note    Patient: Ying Degroot  : 1935  Acct#: [de-identified]    Procedure: Colonoscopy     Date:  2020    Surgeon:  Agustín Garcia MD    Referring Physician:  Maria D Daniel    Previous Colonoscopy: Yes  Date: 1/27/15  Greater than 3 years? Yes    Preoperative Diagnosis:  surveillance    Postoperative Diagnosis:  Normal colon to ileocolic anastomosis. Anesthesia:  See anesthesia note    Indications: This is a 82y.o. year old female who presents today with previous adenomatous polyp. Procedure: An informed consent was obtained from the patient after explanation of indications, benefits, possible risks and complications of the procedure. The patient was then taken to the endoscopy suite, placed in the left lateral decubitus position, and the above IV anesthesia was administered. A digital rectal examination was performed and revealed negative without mass, lesions or tenderness. The Olympus CFQ-180-AL video colonoscope was placed in the patient's rectum under digital direction and advanced to the ileocolic anastomosis. The preparation was excellent. The scope was then withdrawn back through the ascending, transverse, descending and sigmoid colons. Carefull circumferential examination of the mucosa in these areas demonstrated normal colonic mucosa throughout. The scope was then withdrawn into the rectum and retroflexed. The retroflexed view of the anal verge and rectum demonstrates no abnormalities. The scope was straightened, the colon was decompressed and the scope was withdrawn from the patient. The patient tolerated the procedure well and was taken to the PACU in good condition. Estimated Blood Loss:  none    Impression:  Normal colon to ileocolic anastomosis. Recommendations: Follow up with primary care physician. No further surveillance is needed.     Agustín Garcia MD   [facility]  2020

## 2020-11-11 NOTE — H&P
[facility]   Pre-operative History and Physical    Patient: Rhina Santos  : 1935  Acct#: [de-identified]    History Obtained From: electronic medical record    HISTORY OF PRESENT ILLNESS  Procedure:Colonoscopy  Indications:surveillance  Past Medical History:        Diagnosis Date    Arthritis     rheumatiod and osteo    Asthma     CAD (coronary artery disease)     Cancer ([facility] [address]     cancer of cervix--@ age 29   [facility] GERD (gastroesophageal reflux disease)     Heart attack ([facility] [address]     D/T CORONARY SPASM    History of atrial fibrillation     Hyperlipidemia     Hypertension     Liver cyst     REQUIRING DRAINAGE    Prinzmetal angina (HCC)     Raynaud's disease      Past Surgical History:        Procedure Laterality Date    BUNIONECTOMY Bilateral     COLONOSCOPY      colon polyps    CORNEAL TRANSPLANT Bilateral     HAMMER TOE SURGERY Right 2019    HAMMERTOE CORRECTION TOES TWO, FOUR, AND FIVE RIGHT FOOT, HAMMERTOE CORRECTION TOE THIRD LEFT FOOT performed by Shasha Mayorga DPM at [place] [address]      ventral hernia repair with mesh    HYSTERECTOMY  2017    anterior and posterior repair, TVT    JOINT REPLACEMENT Bilateral     bilateral knee replacements    LIPOMA RESECTION      back/back of neck     Medications prior to admission:   Prior to Admission medications    Medication Sig Start Date End Date Taking?  Authorizing Provider   Loteprednol Etabonate (INVELTYS) 1 % SUSP Apply 1 drop to eye daily   Yes Historical Provider, MD   cycloSPORINE (RESTASIS) 0.05 % ophthalmic emulsion Place 1 drop into both eyes 2 times daily   Yes Historical Provider, MD   amLODIPine (NORVASC) 5 MG tablet Take 5 mg by mouth daily   Yes Historical Provider, MD   vitamin B-12 (CYANOCOBALAMIN) 500 MCG tablet Take 500 mcg by mouth daily   Yes Historical Provider, MD   Cholecalciferol (VITAMIN D) 2000 units CAPS capsule Take by mouth daily   Yes Historical Provider, MD   ferrous sulfate 325 (65 Fe) MG tablet Take 325 mg by mouth daily (with breakfast)   Yes Historical Provider, MD   acetaminophen (TYLENOL) 500 MG tablet Take 500 mg by mouth every 6 hours as needed for Pain   Yes Historical Provider, MD   Ascorbic Acid (VITAMIN C) 500 MG tablet Take 500 mg by mouth daily   Yes Historical Provider, MD   aspirin 81 MG tablet Take 81 mg by mouth daily EC   Yes Historical Provider, MD   atorvastatin (LIPITOR) 40 MG tablet Take 40 mg by mouth daily   Yes Historical Provider, MD   Calcium Carbonate-Vit D-Min (CALCIUM 1200 PO) Take 600 mg by mouth 2 times daily    Yes Historical Provider, MD   furosemide (LASIX) 40 MG tablet Take 40 mg by mouth daily    Yes Historical Provider, MD   losartan (COZAAR) 50 MG tablet Take 50 mg by mouth daily   Yes Historical Provider, MD   Potassium Chloride (KLOR-CON 10 PO) Take 20 mEq by mouth daily    Yes Historical Provider, MD   vitamin E 400 UNIT capsule Take 400 Units by mouth daily   Yes Historical Provider, MD   sotalol (BETAPACE) 80 MG tablet Take 80 mg by mouth 2 times daily. Yes Historical Provider, MD   nystatin (MYCOSTATIN) 427675 UNIT/ML suspension Take 500,000 Units by mouth daily as needed  1/8/19   Historical Provider, MD   NITROGLYCERIN SL Place 0.4 mg under the tongue as needed     Historical Provider, MD     Allergies:   Cefaclor; Ibandronic acid; and Penicillins    Social History     Socioeconomic History    Marital status:       Spouse name: Not on file    Number of children: Not on file    Years of education: Not on file    Highest education level: Not on file   Occupational History    Not on file   Social Needs    Financial resource strain: Not on file    Food insecurity     Worry: Not on file     Inability: Not on file    Transportation needs     Medical: Not on file     Non-medical: Not on file   Tobacco Use    Smoking status: Never Smoker    Smokeless tobacco: Never Used   Substance and Sexual Activity    Alcohol use: No    Drug use: Never  Sexual activity: Not Currently   Lifestyle    Physical activity     Days per week: Not on file     Minutes per session: Not on file    Stress: Not on file   Relationships    Social connections     Talks on phone: Not on file     Gets together: Not on file     Attends [employer] service: Not on file     Active member of club or organization: Not on file     Attends meetings of clubs or organizations: Not on file     Relationship status: Not on file    Intimate partner violence     Fear of current or ex partner: Not on file     Emotionally abused: Not on file     Physically abused: Not on file     Forced sexual activity: Not on file   Other Topics Concern    Not on file   Social History Narrative    Not on file     Family History   Problem Relation Age of Onset    Early Death Mother 29    Cancer Father          PHYSICAL EXAM:      /63   Pulse 65   Temp 98.1 °F (36.7 °C) (Temporal)   Resp 16   Ht 5' 4\" (1.626 m)   Wt 142 lb 3.2 oz (64.5 kg)   SpO2 99%   BMI 24.41 kg/m²  I        Heart:normal    Lungs: normal    Abdomen: normal      ASA Grade:  See anesthesia note      ASSESSMENT AND PLAN:    1. Procedure options, risks and benefits reviewed with patient and expresses understanding.

## (undated) DEVICE — BANDAGE,GAUZE,CONFORMING,4"X75",STRL,LF: Brand: MEDLINE

## (undated) DEVICE — GOWN,AURORA,NONREINF,RAGLAN,XXL,STERILE: Brand: MEDLINE

## (undated) DEVICE — SOLUTION IV IRRIG 500ML 0.9% SODIUM CHL 2F7123

## (undated) DEVICE — ENDOSCOPY KIT: Brand: MEDLINE INDUSTRIES, INC.

## (undated) DEVICE — SUTURE PROL SZ 4-0 L18IN NONABSORBABLE BLU L19MM FS-2 3/8 8683G

## (undated) DEVICE — SUTURE VCRL SZ 3-0 L18IN ABSRB UD PS-2 L19MM 3/8 CRV PRIM J497H

## (undated) DEVICE — PRECISION THIN (7.0 X 0.38 X 15.0MM)

## (undated) DEVICE — WIRE FIXATION .045IN 5IN C-WIRE SS SPADE

## (undated) DEVICE — ROOM TURNOVER KIT W/ ARM STRP

## (undated) DEVICE — ZIMMER® STERILE DISPOSABLE TOURNIQUET CUFF WITH PLC, DUAL PORT, SINGLE BLADDER, 18 IN. (46 CM)

## (undated) DEVICE — DRAPE,T,LIMB,BILATERAL,STERILE: Brand: MEDLINE

## (undated) DEVICE — 3M™ COBAN™ NL STERILE NON-LATEX SELF-ADHERENT WRAP, 2084S, 4 IN X 5 YD (10 CM X 4,5 M), 18 ROLLS/CASE: Brand: 3M™ COBAN™

## (undated) DEVICE — CURITY STRETCH BANDAGE: Brand: CURITY

## (undated) DEVICE — GLOVE ORANGE PI 8 1/2   MSG9085

## (undated) DEVICE — PACK PROCEDURE SURG EXTREMITY MFFOP CUST

## (undated) DEVICE — HYPODERMIC SAFETY NEEDLE: Brand: MAGELLAN

## (undated) DEVICE — INTENDED FOR TISSUE SEPARATION, AND OTHER PROCEDURES THAT REQUIRE A SHARP SURGICAL BLADE TO PUNCTURE OR CUT.: Brand: BARD-PARKER ® STAINLESS STEEL BLADES

## (undated) DEVICE — TOWEL,OR,DSP,ST,BLUE,STD,4/PK,20PK/CS: Brand: MEDLINE

## (undated) DEVICE — TRAY PREP DRY W/ PREM GLV 2 APPL 6 SPNG 2 UNDPD 1 OVERWRAP

## (undated) DEVICE — SYRINGE, LUER LOCK, 10ML: Brand: MEDLINE

## (undated) DEVICE — ESMARK: Brand: DEROYAL

## (undated) DEVICE — DRESSING PETRO W3XL3IN OIL EMUL N ADH GZ KNIT IMPREG CELOS

## (undated) DEVICE — MEDICINE CUP, GRADUATED, STER: Brand: MEDLINE

## (undated) DEVICE — STOCKINETTE CAST W6XL48IN WHT POLY IMPERV PRECUT SYN DBL

## (undated) DEVICE — GAUZE,SPONGE,4"X4",8PLY,STRL,LF,10/TRAY: Brand: MEDLINE